# Patient Record
Sex: MALE | Race: ASIAN | NOT HISPANIC OR LATINO
[De-identification: names, ages, dates, MRNs, and addresses within clinical notes are randomized per-mention and may not be internally consistent; named-entity substitution may affect disease eponyms.]

---

## 2018-01-24 ENCOUNTER — HOSPITAL (OUTPATIENT)
Dept: OTHER | Age: 73
End: 2018-01-24

## 2018-05-07 PROBLEM — I10 ESSENTIAL HYPERTENSION: Status: ACTIVE | Noted: 2018-05-07

## 2018-05-07 PROBLEM — E78.5 DYSLIPIDEMIA: Status: ACTIVE | Noted: 2018-05-07

## 2018-08-11 PROBLEM — K21.9 GASTROESOPHAGEAL REFLUX DISEASE WITHOUT ESOPHAGITIS: Status: ACTIVE | Noted: 2018-08-11

## 2018-08-11 PROBLEM — R73.01 IFG (IMPAIRED FASTING GLUCOSE): Status: ACTIVE | Noted: 2018-08-11

## 2018-09-18 PROCEDURE — 88305 TISSUE EXAM BY PATHOLOGIST: CPT | Performed by: INTERNAL MEDICINE

## 2018-09-25 PROBLEM — D12.6 TUBULAR ADENOMA OF COLON: Status: ACTIVE | Noted: 2018-09-25

## 2019-06-13 PROBLEM — R07.89 OTHER CHEST PAIN: Status: ACTIVE | Noted: 2019-06-13

## 2019-06-25 ENCOUNTER — ORDER TRANSCRIPTION (OUTPATIENT)
Dept: ADMINISTRATIVE | Facility: HOSPITAL | Age: 74
End: 2019-06-25

## 2019-06-25 DIAGNOSIS — R94.39 ABNORMAL NUCLEAR STRESS TEST: Primary | ICD-10-CM

## 2019-07-23 ENCOUNTER — HOSPITAL ENCOUNTER (OUTPATIENT)
Dept: CT IMAGING | Facility: HOSPITAL | Age: 74
Discharge: HOME | End: 2019-07-23
Attending: INTERNAL MEDICINE
Payer: MEDICARE

## 2019-07-23 ENCOUNTER — HOSPITAL ENCOUNTER (OUTPATIENT)
Dept: CT IMAGING | Facility: HOSPITAL | Age: 74
End: 2019-07-23
Attending: INTERNAL MEDICINE
Payer: MEDICARE

## 2019-07-23 DIAGNOSIS — R94.39 ABNORMAL NUCLEAR STRESS TEST: ICD-10-CM

## 2019-07-23 DIAGNOSIS — R94.39 ABNORMAL STRESS TEST: ICD-10-CM

## 2019-07-23 PROCEDURE — 75574 CT ANGIO HRT W/3D IMAGE: CPT | Performed by: INTERNAL MEDICINE

## 2019-07-23 PROCEDURE — 82565 ASSAY OF CREATININE: CPT

## 2019-07-23 PROCEDURE — 0502T CTA FRACTIONAL FLOW RESERVE ANALYSIS (CPT=0503T/0502T): CPT | Performed by: INTERNAL MEDICINE

## 2019-07-23 PROCEDURE — 0503T CTA FRACTIONAL FLOW RESERVE ANALYSIS (CPT=0503T/0502T): CPT | Performed by: INTERNAL MEDICINE

## 2019-07-23 RX ORDER — METOPROLOL TARTRATE 5 MG/5ML
INJECTION INTRAVENOUS
Status: DISCONTINUED
Start: 2019-07-23 | End: 2019-07-23 | Stop reason: WASHOUT

## 2019-07-23 RX ORDER — NITROGLYCERIN 0.4 MG/1
TABLET SUBLINGUAL
Status: DISCONTINUED
Start: 2019-07-23 | End: 2019-07-23

## 2019-07-26 LAB — CREAT BLD-MCNC: 0.7 MG/DL (ref 0.7–1.3)

## 2019-09-10 ENCOUNTER — HOSPITAL ENCOUNTER (OUTPATIENT)
Dept: INTERVENTIONAL RADIOLOGY/VASCULAR | Facility: HOSPITAL | Age: 74
Setting detail: HOSPITAL OUTPATIENT SURGERY
Discharge: HOME | End: 2019-09-10
Attending: INTERNAL MEDICINE | Admitting: INTERNAL MEDICINE
Payer: MEDICARE

## 2019-09-10 VITALS
SYSTOLIC BLOOD PRESSURE: 118 MMHG | TEMPERATURE: 98 F | OXYGEN SATURATION: 96 % | HEART RATE: 61 BPM | BODY MASS INDEX: 21.1 KG/M2 | RESPIRATION RATE: 21 BRPM | WEIGHT: 136 LBS | DIASTOLIC BLOOD PRESSURE: 68 MMHG | HEIGHT: 67.5 IN

## 2019-09-10 DIAGNOSIS — R94.39 ABNORMAL STRESS TEST: ICD-10-CM

## 2019-09-10 PROCEDURE — B2151ZZ FLUOROSCOPY OF LEFT HEART USING LOW OSMOLAR CONTRAST: ICD-10-PCS | Performed by: INTERNAL MEDICINE

## 2019-09-10 PROCEDURE — B2111ZZ FLUOROSCOPY OF MULTIPLE CORONARY ARTERIES USING LOW OSMOLAR CONTRAST: ICD-10-PCS | Performed by: INTERNAL MEDICINE

## 2019-09-10 PROCEDURE — 4A023N7 MEASUREMENT OF CARDIAC SAMPLING AND PRESSURE, LEFT HEART, PERCUTANEOUS APPROACH: ICD-10-PCS | Performed by: INTERNAL MEDICINE

## 2019-09-10 PROCEDURE — 93458 L HRT ARTERY/VENTRICLE ANGIO: CPT

## 2019-09-10 RX ORDER — HEPARIN SODIUM 5000 [USP'U]/ML
INJECTION, SOLUTION INTRAVENOUS; SUBCUTANEOUS
Status: COMPLETED
Start: 2019-09-10 | End: 2019-09-10

## 2019-09-10 RX ORDER — LIDOCAINE HYDROCHLORIDE 10 MG/ML
INJECTION, SOLUTION EPIDURAL; INFILTRATION; INTRACAUDAL; PERINEURAL
Status: COMPLETED
Start: 2019-09-10 | End: 2019-09-10

## 2019-09-10 RX ORDER — MIDAZOLAM HYDROCHLORIDE 1 MG/ML
INJECTION INTRAMUSCULAR; INTRAVENOUS
Status: COMPLETED
Start: 2019-09-10 | End: 2019-09-10

## 2019-09-10 NOTE — PROCEDURES
[employer]    PATIENT'S NAME: Ez Leone   ATTENDING PHYSICIAN: Nader Dickey M.D. OPERATING PHYSICIAN: Nader Dickey M.D.    PATIENT ACCOUNT#:   [de-identified]    LOCATION:  [address]  MEDICAL RECORD #:   [id number]       D obtaining informed consent, sedation was achieved with 2 mg of midazolam and 25 mcg of fentanyl. Start 0820, end [phone]. I was present the entire time with a trained observer. A 6-Indonesian sheath was placed in the right femoral artery.   Standard #4 left and

## 2019-09-10 NOTE — PROGRESS NOTES
S/P LHC right groin, c/d/I. VSS. Pedals palpable. Pt denies pain. sarah po well. Dr. Sivan Hooper at bedside with family. D/c instructions reviewed. Pt ambulated to washroom.  IV d/c'd and pt d/c'd to [facility] via JENNIFER Preciado in stable condition

## 2019-12-08 PROBLEM — I25.10 CORONARY ARTERY DISEASE INVOLVING NATIVE CORONARY ARTERY OF NATIVE HEART WITHOUT ANGINA PECTORIS: Status: ACTIVE | Noted: 2019-12-08

## 2019-12-08 PROBLEM — I77.9 ARTERIAL DISEASE (HCC): Status: ACTIVE | Noted: 2019-12-08

## 2019-12-08 PROBLEM — I77.9 ARTERIAL DISEASE: Status: ACTIVE | Noted: 2019-12-08

## 2019-12-18 PROBLEM — M54.2 NECK PAIN: Status: ACTIVE | Noted: 2019-12-18

## 2020-02-26 ENCOUNTER — TELEPHONE (OUTPATIENT)
Dept: NEUROLOGY | Facility: CLINIC | Age: 75
End: 2020-02-26

## 2020-02-26 ENCOUNTER — OFFICE VISIT (OUTPATIENT)
Dept: NEUROLOGY | Facility: CLINIC | Age: 75
End: 2020-02-26
Payer: MEDICARE

## 2020-02-26 ENCOUNTER — APPOINTMENT (OUTPATIENT)
Dept: LAB | Age: 75
End: 2020-02-26
Attending: Other
Payer: MEDICARE

## 2020-02-26 VITALS
HEART RATE: 64 BPM | SYSTOLIC BLOOD PRESSURE: 122 MMHG | RESPIRATION RATE: 16 BRPM | BODY MASS INDEX: 22 KG/M2 | DIASTOLIC BLOOD PRESSURE: 68 MMHG | WEIGHT: 141 LBS

## 2020-02-26 DIAGNOSIS — G44.099 OTHER TRIGEMINAL AUTONOMIC CEPHALGIA (TAC), NOT INTRACTABLE: Primary | ICD-10-CM

## 2020-02-26 DIAGNOSIS — G44.099 OTHER TRIGEMINAL AUTONOMIC CEPHALGIA (TAC), NOT INTRACTABLE: ICD-10-CM

## 2020-02-26 DIAGNOSIS — G44.039 EPISODIC PAROXYSMAL HEMICRANIA, NOT INTRACTABLE: ICD-10-CM

## 2020-02-26 LAB — SED RATE-ML: 14 MM/HR (ref 0–12)

## 2020-02-26 PROCEDURE — 99204 OFFICE O/P NEW MOD 45 MIN: CPT | Performed by: OTHER

## 2020-02-26 PROCEDURE — 36415 COLL VENOUS BLD VENIPUNCTURE: CPT

## 2020-02-26 PROCEDURE — 85652 RBC SED RATE AUTOMATED: CPT

## 2020-02-26 RX ORDER — INDOMETHACIN 25 MG/1
25 CAPSULE ORAL 2 TIMES DAILY WITH MEALS
Qty: 60 CAPSULE | Refills: 2 | Status: SHIPPED | OUTPATIENT
Start: 2020-02-26 | End: 2020-03-24

## 2020-02-26 NOTE — TELEPHONE ENCOUNTER
Per Dr. Dominique Albarran pt Sed Rate ok, ok to start Indomethacin.     Spoke with pt, relayed that per Dr. Dominique Albarran, Sedimentation Rate ok, can start Indomethacin 1 tab bid, with food if needed, also, pt may take Indomethacin 1 tab tid with food if tolerating lower

## 2020-02-26 NOTE — PROGRESS NOTES
HPI:    Patient ID: David Castano is a 69year old male. PCP: Dr Vu Rich    Headache        Patient is a pleasant 69year old man with history of hypertension, hyperlipidemia and CAD who presents for evaluation of new onset headaches.  He has been having Dispense Refill   • TOBRADEX 0.3-0.1 % Ophthalmic Ointment Apply 0.1 Doses to eye daily. • Tolterodine Tartrate 1 MG Oral Tab Take 1 tablet (1 mg total) by mouth 2 (two) times daily.  60 tablet 5   • Benazepril HCl 10 MG Oral Tab Take 1 tablet (10 mg to Intact to all modalities including light touch, pinprick, vibration and proprioception  Motor: Normal tone and bulk in all extremities.  Strength is 5/5 in all muscle groups  Reflexes: symmetric and present  Coordination: Intact  Gait: Normal           ASSE

## 2020-02-26 NOTE — PROGRESS NOTES
Patient [id number] headaches in past month. PT did not help headaches but did help posture. Headaches are behind eyes and top of head.

## 2020-02-27 ENCOUNTER — TELEPHONE (OUTPATIENT)
Dept: NEUROLOGY | Facility: CLINIC | Age: 75
End: 2020-02-27

## 2020-02-27 NOTE — TELEPHONE ENCOUNTER
Received call from pt pharmacy, Insurance will not cover Indomethacin, per pharmacy pt insurance will cover Naproxen 375mg and Nabumetone.

## 2020-02-27 NOTE — TELEPHONE ENCOUNTER
Spoke with pharmacy to see if medication needed PA, Pharmacy states medication is not on pt's formulary. Per note below, Indomethacin (Indocin) is only medication indicated for Paroxysmal Hemicrania headache.     Need to contact pt to see if he is willing t

## 2020-02-27 NOTE — TELEPHONE ENCOUNTER
Patient notified of lab results.       ----- Message from Tita Gomez MD sent at 2/26/2020  2:15 PM CST -----  ESR slightly elevated but not significant for temporal arteritis

## 2020-03-20 ENCOUNTER — TELEPHONE (OUTPATIENT)
Dept: NEUROLOGY | Facility: CLINIC | Age: 75
End: 2020-03-20

## 2020-03-20 NOTE — TELEPHONE ENCOUNTER
Anthony Gallegos called imtiaz verbally consents to a    Virtual/Telephone Check-In service on 03/24/2020 at 3pm.    Patient understands and accepts financial responsibility for any deductible, co-insurance and/or co-pays associated with this service.     Savanna Dong

## 2020-03-24 ENCOUNTER — VIRTUAL CHECK-IN (OUTPATIENT)
Dept: NEUROLOGY | Facility: CLINIC | Age: 75
End: 2020-03-24

## 2020-03-24 DIAGNOSIS — G44.099 OTHER TRIGEMINAL AUTONOMIC CEPHALGIA (TAC), NOT INTRACTABLE: Primary | ICD-10-CM

## 2020-03-24 PROCEDURE — G2012 BRIEF CHECK IN BY MD/QHP: HCPCS | Performed by: OTHER

## 2020-03-24 RX ORDER — INDOMETHACIN 25 MG/1
25 CAPSULE ORAL 2 TIMES DAILY WITH MEALS
Qty: 60 CAPSULE | Refills: 2 | Status: SHIPPED | OUTPATIENT
Start: 2020-03-24 | End: 2020-06-26

## 2020-03-24 NOTE — TELEPHONE ENCOUNTER
Virtual/Telephone Check-In    Sandhya Quijano verbally consents a Virtual/Telephone Check-In service on 03/24/20. Patient understands and accepts financial responsibility for any deductible, co-insurance and/or co-pays associated with this service.     Dur

## 2020-04-25 PROBLEM — G44.039 EPISODIC PAROXYSMAL HEMICRANIA: Status: ACTIVE | Noted: 2020-04-25

## 2020-06-26 ENCOUNTER — TELEPHONE (OUTPATIENT)
Dept: NEUROLOGY | Facility: CLINIC | Age: 75
End: 2020-06-26

## 2020-06-26 ENCOUNTER — OFFICE VISIT (OUTPATIENT)
Dept: NEUROLOGY | Facility: CLINIC | Age: 75
End: 2020-06-26
Payer: MEDICARE

## 2020-06-26 VITALS
DIASTOLIC BLOOD PRESSURE: 70 MMHG | BODY MASS INDEX: 22 KG/M2 | WEIGHT: 138 LBS | SYSTOLIC BLOOD PRESSURE: 118 MMHG | RESPIRATION RATE: 16 BRPM | HEART RATE: 68 BPM

## 2020-06-26 DIAGNOSIS — G44.039 EPISODIC PAROXYSMAL HEMICRANIA, NOT INTRACTABLE: Primary | ICD-10-CM

## 2020-06-26 DIAGNOSIS — G44.099 OTHER TRIGEMINAL AUTONOMIC CEPHALGIA (TAC), NOT INTRACTABLE: Primary | ICD-10-CM

## 2020-06-26 DIAGNOSIS — G44.099 OTHER TRIGEMINAL AUTONOMIC CEPHALGIA (TAC), NOT INTRACTABLE: ICD-10-CM

## 2020-06-26 PROCEDURE — 99213 OFFICE O/P EST LOW 20 MIN: CPT | Performed by: OTHER

## 2020-06-26 RX ORDER — INDOMETHACIN 25 MG/1
25 CAPSULE ORAL 2 TIMES DAILY WITH MEALS
Qty: 60 CAPSULE | Refills: 5 | Status: SHIPPED | OUTPATIENT
Start: 2020-06-26 | End: 2020-12-16

## 2020-06-26 RX ORDER — DOXYCYCLINE HYCLATE 50 MG/1
2 CAPSULE ORAL DAILY
COMMUNITY
Start: 2020-06-08 | End: 2021-08-12 | Stop reason: ALTCHOICE

## 2020-06-26 NOTE — PROGRESS NOTES
HPI:    Patient ID: Nasima Godinez is a 69year old male. PCP: Dr Weaver Men    Headache        Patient is a pleasant 69year old man who presented for paroxysmal hemicrania type of headache. States since we started Indocin headache have improved.  He states Tab Take 1 tablet (10 mg total) by mouth daily. 90 tablet 3   • metoprolol Tartrate 25 MG Oral Tab Take 1 tablet (25 mg total) by mouth 2 (two) times daily. 180 tablet 3   • simvastatin 20 MG Oral Tab Take 1 tablet (20 mg total) by mouth nightly.  90 tablet may take an extra as needed  Follow up in about 6 months    See orders and medications filed with this encounter. The patient indicates understanding of these issues and agrees with the plan.         Evaristo Devlin MD  [facility]

## 2020-06-29 NOTE — TELEPHONE ENCOUNTER
Additional information requested from Caren Nolasco at [facility] for PA determination. Tried and failed medications given.

## 2020-06-30 ENCOUNTER — TELEPHONE (OUTPATIENT)
Dept: NEUROLOGY | Facility: CLINIC | Age: 75
End: 2020-06-30

## 2020-06-30 NOTE — TELEPHONE ENCOUNTER
Received Authorization Fax from [facility]     Indomethacin 25 MG Oral Cap Authorized     *Approved for a Quantity : 60 tablets per 30 day(s). Valid Dates: 06-26/20 - Until Further Notice.

## 2020-06-30 NOTE — TELEPHONE ENCOUNTER
Informed the pt that this med was refilled on 6/26/20. Verbalized understanding, no further questions.

## 2020-10-10 PROBLEM — I73.00 RAYNAUD'S PHENOMENON WITHOUT GANGRENE: Status: ACTIVE | Noted: 2020-10-10

## 2020-12-16 ENCOUNTER — TELEMEDICINE (OUTPATIENT)
Dept: NEUROLOGY | Facility: CLINIC | Age: 75
End: 2020-12-16
Payer: MEDICARE

## 2020-12-16 DIAGNOSIS — G44.039 EPISODIC PAROXYSMAL HEMICRANIA, NOT INTRACTABLE: Primary | ICD-10-CM

## 2020-12-16 PROCEDURE — 99213 OFFICE O/P EST LOW 20 MIN: CPT | Performed by: OTHER

## 2020-12-16 RX ORDER — INDOMETHACIN 25 MG/1
25 CAPSULE ORAL 2 TIMES DAILY WITH MEALS
Qty: 180 CAPSULE | Refills: 1 | Status: SHIPPED | OUTPATIENT
Start: 2020-12-16 | End: 2021-08-12

## 2020-12-17 NOTE — PROGRESS NOTES
HPI:    Patient ID: Broderick Westfall is a 69year old male. This visit is conducted using Telemedicine with live, interactive video and audio.     Patient has been referred to the [facility] website at [email or web address] to review the yearly Consent to Tr mouth 2 (two) times daily with meals. 180 capsule 1   • amLODIPine Besylate 5 MG Oral Tab Take 1 tablet (5 mg total) by mouth daily. 90 tablet 3   • Doxycycline Hyclate 50 MG Oral Cap Take 2 capsules by mouth daily.      • amLODIPine Besylate 5 MG Oral Tab take it with food and use antigas/antiacid  If still no response then take Indocin as needed    Follow up in about 6 months.    Total time spent approximately 15 minutes with greater than 50% spent in care co-ordination and counseling    See orders and medi

## 2021-01-12 NOTE — TELEPHONE ENCOUNTER
Spoke with the pharmacy who states that the patient has refills remaining. Will advised the patient to contact his preferred pharmacy.        Medication: indomethacin 25 MG Oral Cap    Date of last refill: 12/16/2020 (#180/1)  [place]

## 2021-01-15 RX ORDER — INDOMETHACIN 25 MG/1
25 CAPSULE ORAL 2 TIMES DAILY WITH MEALS
Qty: 180 CAPSULE | Refills: 1 | OUTPATIENT
Start: 2021-01-15

## 2021-03-02 PROBLEM — M19.041: Status: ACTIVE | Noted: 2021-03-02

## 2021-03-02 PROBLEM — M67.441 DIGITAL MUCINOUS CYST OF FINGER OF RIGHT HAND: Status: ACTIVE | Noted: 2021-03-02

## 2021-03-10 ENCOUNTER — LAB REQUISITION (OUTPATIENT)
Dept: LAB | Facility: HOSPITAL | Age: 76
End: 2021-03-10
Payer: MEDICARE

## 2021-03-10 DIAGNOSIS — M67.441 GANGLION, RIGHT HAND: ICD-10-CM

## 2021-03-10 PROCEDURE — 88304 TISSUE EXAM BY PATHOLOGIST: CPT | Performed by: ORTHOPAEDIC SURGERY

## 2021-04-26 PROBLEM — R07.89 OTHER CHEST PAIN: Status: RESOLVED | Noted: 2019-06-13 | Resolved: 2021-04-26

## 2021-04-26 PROBLEM — M54.2 NECK PAIN: Status: RESOLVED | Noted: 2019-12-18 | Resolved: 2021-04-26

## 2021-07-08 ENCOUNTER — PATIENT MESSAGE (OUTPATIENT)
Dept: NEUROLOGY | Facility: CLINIC | Age: 76
End: 2021-07-08

## 2021-07-09 NOTE — TELEPHONE ENCOUNTER
From: Giovanna Liu  To: Milagros Arzola MD  Sent: 7/8/2021 4:57 PM CDT  Subject: Other    Erica, Dr. Yadi Carpenter. Trust you and family are doing well.   I just realized that in my television with you last December, you wanted me to see you in 6-months an

## 2021-08-12 ENCOUNTER — OFFICE VISIT (OUTPATIENT)
Dept: NEUROLOGY | Facility: CLINIC | Age: 76
End: 2021-08-12
Payer: MEDICARE

## 2021-08-12 VITALS
BODY MASS INDEX: 21 KG/M2 | DIASTOLIC BLOOD PRESSURE: 58 MMHG | WEIGHT: 136 LBS | HEART RATE: 72 BPM | SYSTOLIC BLOOD PRESSURE: 119 MMHG | RESPIRATION RATE: 16 BRPM

## 2021-08-12 DIAGNOSIS — G44.039 EPISODIC PAROXYSMAL HEMICRANIA, NOT INTRACTABLE: Primary | ICD-10-CM

## 2021-08-12 DIAGNOSIS — G44.099 OTHER TRIGEMINAL AUTONOMIC CEPHALGIA (TAC), NOT INTRACTABLE: ICD-10-CM

## 2021-08-12 PROCEDURE — 99213 OFFICE O/P EST LOW 20 MIN: CPT | Performed by: OTHER

## 2021-08-12 RX ORDER — INDOMETHACIN 25 MG/1
25 CAPSULE ORAL 2 TIMES DAILY WITH MEALS
Qty: 180 CAPSULE | Refills: 1 | Status: SHIPPED | OUTPATIENT
Start: 2021-08-12 | End: 2021-12-29

## 2021-08-12 NOTE — PATIENT INSTRUCTIONS
After your visit at the [facility] office  today, please direct any follow up questions or medication needs to the staff in our [place] office so that your concerns may be promptly addressed.   We are available through [employer] or at the numbers below: be picked up in office. • Please allow the office 2-3 business days to fill the prescription. • Patient must present photo ID at time of . PLEASE NOTE: PRESCRIPTIONS MUST BE PICKED UP PRIOR TO 3:00PM MONDAY-FRIDAY    Scheduling Tests:     If your submitting forms to office staff. • Form completion may require an additional fee. • A signed Release of Information (ROOPA) must be on file before forms may be submitted. When dropping off forms, please ask the  for this paper.    • Failure

## 2021-08-12 NOTE — PROGRESS NOTES
HPI:    Patient ID: Malissa Carrel is a 77year old male. PCP: Dr Norm Singleton    Headache     Neurologic Problem  Associated symptoms include headaches.      Patient is a pleasant 77year old man who presented for follow up for probable paroxysmal hemicrania t tablet 5   • amLODIPine Besylate 10 MG Oral Tab Take 1 tablet (10 mg total) by mouth daily. 90 tablet 3   • Benazepril HCl 10 MG Oral Tab Take 1 tablet (10 mg total) by mouth daily.  90 tablet 3   • metoprolol Tartrate 25 MG Oral Tab Take 1 tablet (25 mg to patient indicates understanding of these issues and agrees with the plan. Isaac Montana MD  [facility]          No orders of the defined types were placed in this encounter.       Meds This Visit:  Requested Prescriptions

## 2021-12-29 ENCOUNTER — TELEPHONE (OUTPATIENT)
Dept: NEUROLOGY | Facility: CLINIC | Age: 76
End: 2021-12-29

## 2021-12-29 DIAGNOSIS — G44.099 OTHER TRIGEMINAL AUTONOMIC CEPHALGIA (TAC), NOT INTRACTABLE: Primary | ICD-10-CM

## 2021-12-29 RX ORDER — INDOMETHACIN 25 MG/1
25 CAPSULE ORAL 2 TIMES DAILY WITH MEALS
Qty: 180 CAPSULE | Refills: 1 | Status: SHIPPED | OUTPATIENT
Start: 2021-12-29

## 2021-12-29 RX ORDER — INDOMETHACIN 25 MG/1
25 CAPSULE ORAL 2 TIMES DAILY WITH MEALS
Qty: 180 CAPSULE | Refills: 1 | Status: SHIPPED | OUTPATIENT
Start: 2021-12-29 | End: 2021-12-29

## 2021-12-29 NOTE — TELEPHONE ENCOUNTER
Patient calling to request refill ofneed refill on indomethacin [phone] [facility]    Patient informed of 48 hour refill policy excluding weekends and holidays. Informed patient prescription is sent directly to pharmacy.     Further

## 2021-12-29 NOTE — TELEPHONE ENCOUNTER
Rx Indomethacin sent to [facility] per patient request and [facility] prescription cancelled.

## 2021-12-29 NOTE — TELEPHONE ENCOUNTER
Medication: indomethacin 25 MG Oral Cap     Date of last refill: 8/12/21 (#180/1)  [place]     Last office visit: 8/12/21  Due back to clinic per last office note:  6 months  Date next office visit scheduled:     Gómez

## 2022-02-16 ENCOUNTER — OFFICE VISIT (OUTPATIENT)
Dept: NEUROLOGY | Facility: CLINIC | Age: 77
End: 2022-02-16
Payer: MEDICARE

## 2022-02-16 VITALS
HEIGHT: 67 IN | DIASTOLIC BLOOD PRESSURE: 62 MMHG | BODY MASS INDEX: 22.13 KG/M2 | RESPIRATION RATE: 12 BRPM | HEART RATE: 72 BPM | SYSTOLIC BLOOD PRESSURE: 130 MMHG | WEIGHT: 141 LBS

## 2022-02-16 DIAGNOSIS — R41.3 SHORT-TERM MEMORY LOSS: ICD-10-CM

## 2022-02-16 DIAGNOSIS — R42 DIZZINESS: ICD-10-CM

## 2022-02-16 DIAGNOSIS — G44.039 EPISODIC PAROXYSMAL HEMICRANIA, NOT INTRACTABLE: ICD-10-CM

## 2022-02-16 PROCEDURE — 99214 OFFICE O/P EST MOD 30 MIN: CPT | Performed by: OTHER

## 2022-02-16 NOTE — PROGRESS NOTES
LOV 8/12/21 Headache f/u- Patient states his headaches come & go. Patient states intensity is about the same. Patient states duration of headaches 1-3 minutes usually.

## 2022-02-21 ENCOUNTER — LAB ENCOUNTER (OUTPATIENT)
Dept: LAB | Age: 77
End: 2022-02-21
Attending: Other
Payer: MEDICARE

## 2022-02-21 DIAGNOSIS — R41.3 SHORT-TERM MEMORY LOSS: ICD-10-CM

## 2022-02-21 LAB
TSI SER-ACNC: 3.74 MIU/ML (ref 0.36–3.74)
VIT B12 SERPL-MCNC: 366 PG/ML (ref 193–986)

## 2022-02-21 PROCEDURE — 82607 VITAMIN B-12: CPT

## 2022-02-21 PROCEDURE — 36415 COLL VENOUS BLD VENIPUNCTURE: CPT

## 2022-02-21 PROCEDURE — 84443 ASSAY THYROID STIM HORMONE: CPT

## 2022-02-22 ENCOUNTER — PATIENT MESSAGE (OUTPATIENT)
Dept: NEUROLOGY | Facility: CLINIC | Age: 77
End: 2022-02-22

## 2022-02-22 NOTE — TELEPHONE ENCOUNTER
From: Semaj Thorne  To: Marni Johnston MD  Sent: 2/22/2022 9:19 AM CST  Subject: Test Result Feedback    Good morning, Dr. Clint Bills. At your convenience, appreciate your review/recommendation of the blood test results. Thank you.   Juan Daniel Figueroa

## 2022-02-28 ENCOUNTER — TELEPHONE (OUTPATIENT)
Dept: NEUROLOGY | Facility: CLINIC | Age: 77
End: 2022-02-28

## 2022-02-28 NOTE — TELEPHONE ENCOUNTER
When I call the number the office indicates he does not work with that office any longer. When I google search I get Candie Marley with a \"N\" I'm confused if the last name is spelled correctly. Can you please call me with correct spelling?

## 2022-02-28 NOTE — TELEPHONE ENCOUNTER
Informed pt that providers name is Dr Chen Muse. Advised pt that if that provider is no longer there he can see any of the doctors on the referral. Verbalized understanding, no further questions.

## 2022-07-09 ENCOUNTER — HOSPITAL ENCOUNTER (EMERGENCY)
Facility: HOSPITAL | Age: 77
Discharge: HOME | End: 2022-07-09
Attending: EMERGENCY MEDICINE
Payer: MEDICARE

## 2022-07-09 VITALS
SYSTOLIC BLOOD PRESSURE: 138 MMHG | RESPIRATION RATE: 18 BRPM | HEART RATE: 70 BPM | WEIGHT: 140 LBS | OXYGEN SATURATION: 98 % | DIASTOLIC BLOOD PRESSURE: 72 MMHG | BODY MASS INDEX: 22 KG/M2 | TEMPERATURE: 98 F

## 2022-07-09 DIAGNOSIS — S61.432A PUNCTURE WOUND OF LEFT HAND WITHOUT FOREIGN BODY, INITIAL ENCOUNTER: Primary | ICD-10-CM

## 2022-07-09 PROCEDURE — 99283 EMERGENCY DEPT VISIT LOW MDM: CPT

## 2022-07-09 RX ORDER — ROSUVASTATIN CALCIUM 5 MG/1
5 TABLET, COATED ORAL NIGHTLY
COMMUNITY
Start: 2022-06-27

## 2022-07-09 RX ORDER — CEPHALEXIN 500 MG/1
500 CAPSULE ORAL 3 TIMES DAILY
Qty: 15 CAPSULE | Refills: 0 | Status: SHIPPED | OUTPATIENT
Start: 2022-07-09 | End: 2022-07-14

## 2022-07-09 RX ORDER — CEPHALEXIN 500 MG/1
500 CAPSULE ORAL ONCE
Status: COMPLETED | OUTPATIENT
Start: 2022-07-09 | End: 2022-07-09

## 2022-12-20 ENCOUNTER — TELEPHONE (OUTPATIENT)
Dept: NEUROLOGY | Facility: CLINIC | Age: 77
End: 2022-12-20

## 2022-12-20 DIAGNOSIS — G44.099 OTHER TRIGEMINAL AUTONOMIC CEPHALGIA (TAC), NOT INTRACTABLE: ICD-10-CM

## 2022-12-20 RX ORDER — INDOMETHACIN 25 MG/1
25 CAPSULE ORAL 2 TIMES DAILY WITH MEALS
Qty: 180 CAPSULE | Refills: 1 | Status: SHIPPED | OUTPATIENT
Start: 2022-12-20

## 2022-12-20 NOTE — TELEPHONE ENCOUNTER
Patient calling to request refill of Indomethacin  [address] [place] [address] [address] [facility], [address] [place] [address]  [phone], [phone]    Patient informed of 48 hour refill policy excluding weekends and holidays. Informed patient prescription is sent directly to pharmacy. Further explained patient will not receive a call back once prescription is ready.

## 2022-12-29 ENCOUNTER — OFFICE VISIT (OUTPATIENT)
Dept: NEUROLOGY | Facility: CLINIC | Age: 77
End: 2022-12-29
Payer: MEDICARE

## 2022-12-29 VITALS
HEART RATE: 64 BPM | WEIGHT: 140 LBS | SYSTOLIC BLOOD PRESSURE: 108 MMHG | DIASTOLIC BLOOD PRESSURE: 64 MMHG | RESPIRATION RATE: 16 BRPM | BODY MASS INDEX: 22 KG/M2

## 2022-12-29 DIAGNOSIS — R41.3 SHORT-TERM MEMORY LOSS: Primary | ICD-10-CM

## 2022-12-29 DIAGNOSIS — F34.1 DYSTHYMIA: ICD-10-CM

## 2022-12-29 DIAGNOSIS — G44.099 OTHER TRIGEMINAL AUTONOMIC CEPHALGIA (TAC), NOT INTRACTABLE: ICD-10-CM

## 2022-12-29 PROCEDURE — 99213 OFFICE O/P EST LOW 20 MIN: CPT | Performed by: OTHER

## 2022-12-29 RX ORDER — AMLODIPINE BESYLATE 10 MG/1
10 TABLET ORAL DAILY
COMMUNITY
Start: 2022-05-09 | End: 2023-05-04

## 2022-12-29 RX ORDER — BENAZEPRIL HYDROCHLORIDE 20 MG/1
1 TABLET ORAL AS DIRECTED
COMMUNITY
Start: 2022-11-01

## 2023-06-22 RX ORDER — ALFUZOSIN HYDROCHLORIDE 10 MG/1
10 TABLET, EXTENDED RELEASE ORAL DAILY
COMMUNITY

## 2023-07-05 ENCOUNTER — HOSPITAL ENCOUNTER (OUTPATIENT)
Facility: HOSPITAL | Age: 78
Setting detail: HOSPITAL OUTPATIENT SURGERY
Discharge: HOME | End: 2023-07-05
Attending: INTERNAL MEDICINE | Admitting: INTERNAL MEDICINE
Payer: MEDICARE

## 2023-07-05 VITALS
RESPIRATION RATE: 16 BRPM | WEIGHT: 140 LBS | DIASTOLIC BLOOD PRESSURE: 54 MMHG | SYSTOLIC BLOOD PRESSURE: 99 MMHG | HEART RATE: 71 BPM | HEIGHT: 66 IN | TEMPERATURE: 98 F | OXYGEN SATURATION: 98 % | BODY MASS INDEX: 22.5 KG/M2

## 2023-07-05 PROCEDURE — 0DJD8ZZ INSPECTION OF LOWER INTESTINAL TRACT, VIA NATURAL OR ARTIFICIAL OPENING ENDOSCOPIC: ICD-10-PCS | Performed by: INTERNAL MEDICINE

## 2023-07-05 PROCEDURE — 99152 MOD SED SAME PHYS/QHP 5/>YRS: CPT | Performed by: INTERNAL MEDICINE

## 2023-07-05 RX ORDER — SODIUM CHLORIDE, SODIUM LACTATE, POTASSIUM CHLORIDE, CALCIUM CHLORIDE 600; 310; 30; 20 MG/100ML; MG/100ML; MG/100ML; MG/100ML
INJECTION, SOLUTION INTRAVENOUS CONTINUOUS
Status: DISCONTINUED | OUTPATIENT
Start: 2023-07-05 | End: 2023-07-05

## 2023-07-05 RX ORDER — MIDAZOLAM HYDROCHLORIDE 1 MG/ML
INJECTION INTRAMUSCULAR; INTRAVENOUS
Status: DISCONTINUED | OUTPATIENT
Start: 2023-07-05 | End: 2023-07-05

## 2023-07-05 NOTE — OPERATIVE REPORT
[facility] Patient Status:  Hospital Outpatient Surgery    1945 MRN [id number]   Location [id number] [place] [address] Attending Nancy Ackerman MD   Hosp Day # 0 PCP Mark Morrison MD         PATIENT NAME: Vishal Juarez  DATE OF OPERATION: 2023    PREOPERATIVE DIAGNOSIS:  CRC screening Hx polyp  POSTOPERATIVE DIAGNOSIS:  Normal colon    PROCEDURE PERFORMED: Colonoscopy with conscious sedation  SURGEON: Daisy Becker MD   MEDICATIONS: Fentanyl 100 mcg IV and Versed 4 mg IV in divided doses under the supervision of Dr. Ann Becker. PROCEDURE AND FINDINGS: The patient was placed into the left lateral decubitus position after informed consent was obtained. All questions were answered. An ASA score was assigned, Mallampati score 1. IV sedation was administered. A rectal exam was performed which was normal.  The Olympus video colonoscope was then introduced through the rectum and advanced through the colon to the cecum. The quality of prep was excellent, adequate, Aronchick 1. The cecum was identified by the ileocecal valve and appendiceal orifice. The colonoscope was then withdrawn and the mucosa was further carefully inspected. The entire examined colon was otherwise normal.  After retroflexion in the rectum, the colonoscope was straightened and removed and the procedure was completed. The patient tolerated the procedure well. There were no implants placed nor significant blood loss. There were no immediate apparent complications. Total moderate sedation time was 17 minutes. A trained sedation nurse was present to assist in monitoring the patient during the entire length of the moderate sedation time. RECOMMENDATIONS   Consume a high fiber diet. No repeat colonoscopy needed    Daisy Becker MD

## 2023-07-05 NOTE — DISCHARGE INSTRUCTIONS
[facility]    Procedure(s): Colonoscopy    Findings:    1. Normal colon    Disposition:  home    Patient Instructions:     1. Consume a high fiber diet. 2.  No repeat colonoscopy needed. Ryan Casiano MD    Home Care Instructions for Colonoscopy with Sedation    Diet:  - Resume your regular diet   - Start with light meals to minimize bloating.  - Do not drink alcohol today. Medication:  - If you have questions about resuming your normal medications, please contact your Primary Care Physician. Activities:  - Take it easy today. Do not return to work today. - Do not drive today. - Do not operate any machinery today (including kitchen equipment). Colonoscopy:  - You may notice some rectal \"spotting\" (a little blood on the toilet tissue) for a day or two after the exam. This is normal.  - If you experience any rectal bleeding (not spotting), persistent tenderness or sharp severe abdominal pains, oral temperature over 100 degrees Fahrenheit, light-headedness or dizziness, or any other problems, contact your doctor. **If unable to reach your doctor, please go to the [facility] Emergency Room**    - Your referring physician will receive a full report of your examination.  - If you do not hear from your doctor's office within two weeks of your biopsy, please call them for your results.

## 2023-07-05 NOTE — PRE-SEDATION ASSESSMENT
Physician Pre-Sedation Assessment    Pre-Sedation Assessment:    Sedation History: Previous Sedation with No Complications and Airway Assessed    Cardiac: normal S1, S2  Respiratory: breath sounds clear bilaterally   Abdomen: soft, BS (+), non-tender    ASA Classification: 2.  Patient with mild systemic disease    Plan: IV Sedation
Pfizer dose 1 and 2

## 2023-10-02 DIAGNOSIS — G44.099 OTHER TRIGEMINAL AUTONOMIC CEPHALGIA (TAC), NOT INTRACTABLE: ICD-10-CM

## 2023-10-02 RX ORDER — INDOMETHACIN 25 MG/1
25 CAPSULE ORAL 2 TIMES DAILY WITH MEALS
Qty: 60 CAPSULE | Refills: 0 | Status: SHIPPED | OUTPATIENT
Start: 2023-10-02

## 2023-10-02 NOTE — TELEPHONE ENCOUNTER
Spoke with patient who denies missing any doses of Indocin. Patient states he will be traveling to [facility] in early Nov -March. Armaan't scheduled with Dr Scotty Toledo on 10/25. Medication: Indomethacin     Date of last refill: 12/20/22 (#180/1)  [place]      Last office visit: 12/29/2022  Due back to clinic per last office note:  6 months  Date next office visit scheduled:    No future appointments. Last OV note recommendation:    1. 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 The patient believes that he was going through some personal stress and that reflected on the testing. Does not feel like she has any depression  Observation for now. We will repeat the testing next year         2. Episodic paroxysmal hemicrania  Stable.   On Indocin as needed     Follow up in about 6 months

## 2023-10-02 NOTE — TELEPHONE ENCOUNTER
Pt requesting refill for indomethacin 25 MG Oral Cap; [phone]. Please advise, Pt's best call back number is [phone]. Endorsed to RN for Provider.

## 2023-10-25 ENCOUNTER — OFFICE VISIT (OUTPATIENT)
Dept: NEUROLOGY | Facility: CLINIC | Age: 78
End: 2023-10-25

## 2023-10-25 VITALS
BODY MASS INDEX: 23 KG/M2 | SYSTOLIC BLOOD PRESSURE: 128 MMHG | DIASTOLIC BLOOD PRESSURE: 68 MMHG | HEART RATE: 62 BPM | RESPIRATION RATE: 16 BRPM | WEIGHT: 144.81 LBS

## 2023-10-25 DIAGNOSIS — G44.099 OTHER TRIGEMINAL AUTONOMIC CEPHALGIA (TAC), NOT INTRACTABLE: ICD-10-CM

## 2023-10-25 DIAGNOSIS — R41.3 SHORT-TERM MEMORY LOSS: Primary | ICD-10-CM

## 2023-10-25 PROCEDURE — 99213 OFFICE O/P EST LOW 20 MIN: CPT | Performed by: OTHER

## 2023-10-25 RX ORDER — INDOMETHACIN 25 MG/1
25 CAPSULE ORAL 2 TIMES DAILY WITH MEALS
Qty: 60 CAPSULE | Refills: 5 | Status: SHIPPED | OUTPATIENT
Start: 2023-10-25

## 2023-10-25 RX ORDER — AMLODIPINE BESYLATE 10 MG/1
10 TABLET ORAL DAILY
COMMUNITY
Start: 2023-05-11 | End: 2024-05-05

## 2023-10-25 RX ORDER — METOPROLOL TARTRATE 50 MG/1
TABLET, FILM COATED ORAL
COMMUNITY
Start: 2023-10-23

## 2023-10-25 NOTE — PROGRESS NOTES
HPI:    Patient ID: Mark Kumari is a 69year old male. Memory Loss  The patient's primary symptoms include memory loss. Pertinent negatives include no dizziness. Patient is a 69year old male presents for follow-up and to discuss the neuropsychology evaluation report. He was advised on last visit to get a neuropsychology evaluation done for short-term memory problem. Family had noticed that his decision making skills are not as good as before. States due to Covid pandemic he was having very limited social interaction and was probably feeling slightly low at that time. Also her mother in law and was not doing well- had a stroke and then fell and had femur fracture. Neuropsychology evaluation done reports dysthmic disorder and mild neurocognitive disorder.          HISTORY:  Past Medical History:   Diagnosis Date    Chest pain     Coronary atherosclerosis     COVID-19 03/2023    fever, body aches, runny nose    Hearing impairment     per children    High blood pressure     High cholesterol     Hx of motion sickness     Hyperlipidemia     Visual impairment     glasses      Past Surgical History:   Procedure Laterality Date    COLONOSCOPY N/A 7/5/2023    Procedure: COLONOSCOPY;  Surgeon: Holley Ibarra MD;  Location: [facility] ENDOSCOPY    COLONOSCOPY & POLYPECTOMY  09/2018    adenoma- repeat 5 yrs    CYST REMOVAL Right 3/10/21--Dr. Ferraro Manual    thumb mucous cyst excision      Family History   Problem Relation Age of Onset    Asthma Mother     Diabetes Father     Heart Attack Father     Diabetes Brother     Diabetes Brother       Social History     Socioeconomic History    Marital status:    Tobacco Use    Smoking status: Never    Smokeless tobacco: Never   Vaping Use    Vaping Use: Never used   Substance and Sexual Activity    Alcohol use: Never     Alcohol/week: 0.0 standard drinks of alcohol    Drug use: Never   Other Topics Concern    Caffeine Concern Yes     Comment: coffee and tea- 2/day total Exercise No        Review of Systems   Constitutional: Negative. HENT: Negative. Eyes: Negative. Respiratory: Negative. Cardiovascular: Negative. Gastrointestinal: Negative. Endocrine: Negative. Genitourinary: Negative. Musculoskeletal: Negative. Skin: Negative. Allergic/Immunologic: Negative. Neurological:  Negative for dizziness, speech difficulty and numbness. Hematological: Negative. Psychiatric/Behavioral:  Positive for memory loss. All other systems reviewed and are negative. Current Outpatient Medications   Medication Sig Dispense Refill    amLODIPine 10 MG Oral Tab Take 1 tablet (10 mg total) by mouth daily. metoprolol tartrate 50 MG Oral Tab       indomethacin 25 MG Oral Cap Take 1 capsule (25 mg total) by mouth 2 (two) times daily with meals. 60 capsule 0    alfuzosin ER 10 MG Oral Tablet 24 Hr Take 1 tablet (10 mg total) by mouth daily. Benazepril HCl 20 MG Oral Tab Take 1 tablet (20 mg total) by mouth daily. rosuvastatin 5 MG Oral Tab Take 1 tablet (5 mg total) by mouth every other day. aspirin 81 MG Oral Tab Take 1 tablet (81 mg total) by mouth nightly. Allergies:  Codeine                 OTHER (SEE COMMENTS)    Comment:Light headed and dizzy  PHYSICAL EXAM:   Physical Exam  Blood pressure 128/68, pulse 62, resp. rate 16, weight 144 lb 12.8 oz (65.7 kg). General Appearance: Well nourished, well developed, no apparent distress. HEENT: Normocephalic and atraumatic. Cardiovascular: Normal rate, regular rhythm and normal heart sounds. Pulmonary/Chest: Effort normal and breath sounds normal.   Abdominal: Soft.  Bowel sounds are normal.   Psych: normal mood and affect    Neurological  Patient is awake, alert and oriented to person, place and time   Speech and language is intact    Cranial Nerves:   II: Visual acuity and visual field: normal  III: Pupils: equal, round, reactive to light  III,IV,VI: Extra Ocular Movements: intact  V: Facial sensation: intact  VII: Facial strength: intact  VIII: Hearing: intact  IX: Palate: intact  XI: Shoulder shrug: intact  XII: Tongue movement: normal    Motor: Normal tone. Strength is  5 out of 5 in all extremities bilaterally. Sensory: Sensory examination is normal to light touch and pinprick     Coordination: Finger-to-nose test intact    Gait: normal casual gait. Romberg negative. TESTS/IMAGING:     MRI brain- 2020  IMPRESSION:       1. No acute intracranial abnormality. 2. Mild to moderate chronic microvascular ischemic disease. ASSESSMENT/PLAN:   (R41.3) Short-term memory loss  (primary encounter diagnosis)    (G44.039) Episodic paroxysmal hemicrania, not intractable      1. 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 The patient believes that he was going through some personal stress and that reflected on the testing. Observation for now. We will repeat the testing as needed      2. Episodic paroxysmal hemicrania  Stable. On Indocin as needed    Follow up in about 1 year or as needed    See orders and medications filed with this encounter. The patient indicates understanding of these issues and agrees with the plan.       Alejandro Gordillo MD  [facility] [address]      Meds This Visit:  Requested Prescriptions      No prescriptions requested or ordered in this encounter       Imaging & Referrals:  None     YO#[id number]

## 2023-11-02 ENCOUNTER — TELEPHONE (OUTPATIENT)
Dept: NEUROLOGY | Facility: CLINIC | Age: 78
End: 2023-11-02

## 2023-11-02 DIAGNOSIS — G44.099 OTHER TRIGEMINAL AUTONOMIC CEPHALGIA (TAC), NOT INTRACTABLE: ICD-10-CM

## 2023-11-02 RX ORDER — INDOMETHACIN 25 MG/1
25 CAPSULE ORAL 2 TIMES DAILY WITH MEALS
Qty: 180 CAPSULE | Refills: 0 | Status: SHIPPED | OUTPATIENT
Start: 2023-11-02

## 2023-11-02 NOTE — TELEPHONE ENCOUNTER
The last Rx for indomethacin 25 MG Oral Cap was for 30 days. Please change back to 90 days. He will be leaving the country and needs the 90 days. Please advise, Pt's best call back number is [phone]. Endorsed to RN for Provider.

## 2023-11-02 NOTE — TELEPHONE ENCOUNTER
Resent prescription for a 90 day supply. Medication: indomethacin     Date of last refill: 10/25/2023 (#60/5)  [place] 78/00/7395     Last office visit: 10/25/2023  Due back to clinic per last office note:  1 year  Date next office visit scheduled:    No future appointments. Last OV note recommendation:    ASSESSMENT/PLAN:   (R41.3) Short-term memory loss  (primary encounter diagnosis)     (G44.039) Episodic paroxysmal hemicrania, not intractable        1. 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 The patient believes that he was going through some personal stress and that reflected on the testing. Observation for now. We will repeat the testing as needed        2. Episodic paroxysmal hemicrania  Stable.   On Indocin as needed

## 2024-12-02 DIAGNOSIS — G44.099 OTHER TRIGEMINAL AUTONOMIC CEPHALGIA (TAC), NOT INTRACTABLE: ICD-10-CM

## 2024-12-02 RX ORDER — INDOMETHACIN 25 MG/1
25 CAPSULE ORAL 2 TIMES DAILY WITH MEALS
Qty: 180 CAPSULE | Refills: 0 | Status: SHIPPED | OUTPATIENT
Start: 2024-12-02

## 2024-12-02 NOTE — TELEPHONE ENCOUNTER
Future Appointments   Date Time Provider Department Center   12/9/2024  4:10 PM Malik Guerrero MD [facility]

## 2024-12-02 NOTE — TELEPHONE ENCOUNTER
Sent the patient a [employer] message to make an appointment for further medication refills.       Medication: indomethacin 25 MG Oral Cap      Date of last refill: 11/02/2023 (#180/0)  [place]     Last office visit: 10/25/2023  Due back to clinic per last office note:  Around 10/25/2024  Date next office visit scheduled:    No future appointments.        Last OV note recommendation:    ASSESSMENT/PLAN:   (R41.3) Short-term memory loss  (primary encounter diagnosis)     (G44.039) Episodic paroxysmal hemicrania, not intractable        1.  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    The patient believes that he was going through some personal stress and that reflected on the testing.    Observation for now.  We will repeat the testing as needed        2. Episodic paroxysmal hemicrania  Stable.  On Indocin as needed     Follow up in about 1 year or as needed     See orders and medications filed with this encounter. The patient indicates understanding of these issues and agrees with the plan.        Malik Guerrero MD  [facility] [place]

## 2024-12-09 ENCOUNTER — OFFICE VISIT (OUTPATIENT)
Dept: NEUROLOGY | Facility: CLINIC | Age: 79
End: 2024-12-09
Payer: MEDICARE

## 2024-12-09 VITALS
HEART RATE: 68 BPM | BODY MASS INDEX: 23 KG/M2 | WEIGHT: 142 LBS | DIASTOLIC BLOOD PRESSURE: 62 MMHG | RESPIRATION RATE: 16 BRPM | SYSTOLIC BLOOD PRESSURE: 112 MMHG

## 2024-12-09 DIAGNOSIS — G44.099 OTHER TRIGEMINAL AUTONOMIC CEPHALGIA (TAC), NOT INTRACTABLE: Primary | ICD-10-CM

## 2024-12-09 PROCEDURE — 99213 OFFICE O/P EST LOW 20 MIN: CPT | Performed by: OTHER

## 2024-12-09 RX ORDER — EZETIMIBE 10 MG/1
10 TABLET ORAL NIGHTLY
COMMUNITY
Start: 2023-08-24

## 2024-12-09 RX ORDER — AMLODIPINE BESYLATE 10 MG/1
10 TABLET ORAL DAILY
COMMUNITY
Start: 2024-09-10

## 2024-12-09 NOTE — PATIENT INSTRUCTIONS
Refill policies:    Allow 2-3 business days for refills; controlled substances may take longer.  Contact your pharmacy at least 5 days prior to running out of medication and have them send an electronic request or submit request through the “request refill” option in your [employer] account.  Refills are not addressed on weekends; covering physicians do not authorize routine medications on weekends.  No narcotics or controlled substances are refilled after noon on Fridays or by on call physicians.  By law, narcotics must be electronically prescribed.  A 30 day supply with no refills is the maximum allowed.  If your prescription is due for a refill, you may be due for a follow up appointment.  To best provide you care, patients receiving routine medications need to be seen at least once a year.  Patients receiving narcotic/controlled substance medications need to be seen at least once every 3 months.  In the event that your preferred pharmacy does not have the requested medication in stock (e.g. Backordered), it is your responsibility to find another pharmacy that has the requested medication available.  We will gladly send a new prescription to that pharmacy at your request.    Scheduling Tests:    If your physician has ordered radiology tests such as MRI or CT scans, please contact Central Scheduling at [phone] right away to schedule the test.  Once scheduled, the [facility] Centralized Referral Team will work with your insurance carrier to obtain pre-certification or prior authorization.  Depending on your insurance carrier, approval may take 3-10 days.  It is highly recommended patients assure they have received an authorization before having a test performed.  If test is done without insurance authorization, patient may be responsible for the entire amount billed.      Precertification and Prior Authorizations:  If your physician has recommended that you have a procedure or additional testing performed the [facility]  Centralized Referral Team will contact your insurance carrier to obtain pre-certification or prior authorization.    You are strongly encouraged to contact your insurance carrier to verify that your procedure/test has been approved and is a COVERED benefit.  Although the [facility] Centralized Referral Team does its due diligence, the insurance carrier gives the disclaimer that \"Although the procedure is authorized, this does not guarantee payment.\"    Ultimately the patient is responsible for payment.   Thank you for your understanding in this matter.  Paperwork Completion:  If you require FMLA or disability paperwork for your recovery, please make sure to either drop it off or have it faxed to our office at [phone]. Be sure the form has your name and date of birth on it.  The form will be faxed to our Forms Department and they will complete it for you.  There is a 25$ fee for all forms that need to be filled out.  Please be aware there is a 10-14 day turnaround time.  You will need to sign a release of information (ROOPA) form if your paperwork does not come with one.  You may call the Forms Department with any questions at [phone].  Their fax number is [phone].

## 2024-12-09 NOTE — PROGRESS NOTES
HPI:    Patient ID: Jose Haley is a 78 year old male.    Neurologic Problem  The patient's primary symptoms include memory loss. Pertinent negatives include no dizziness.   Memory Loss  The patient's primary symptoms include memory loss. Pertinent negatives include no dizziness.         Mr Haley is a 78 year old male who presents for follow up. Still gets some intermittently headache, very infrequent and takes Indocin as needed  States memory is stable, no significant issues noted.No new complaints      Office visit: Oct 2023  Patient is a 77 year old male presents for follow-up and to discuss the neuropsychology evaluation report.  He was advised on last visit to get a neuropsychology evaluation done for short-term memory problem. Family had noticed that his decision making skills are not as good as before. States due to Covid pandemic he was having very limited social interaction and was probably feeling slightly low at that time. Also her mother in law and was not doing well- had a stroke and then fell and had femur fracture.  Neuropsychology evaluation done reports dysthmic disorder and mild neurocognitive disorder.         HISTORY:  Past Medical History:    Chest pain    Coronary atherosclerosis    COVID-19    fever, body aches, runny nose    Hearing impairment    per children    High blood pressure    High cholesterol    Hx of motion sickness    Hyperlipidemia    Visual impairment    glasses      Past Surgical History:   Procedure Laterality Date    Colonoscopy N/A 7/5/2023    Procedure: COLONOSCOPY;  Surgeon: Adolfo Murillo MD;  Location:  ENDOSCOPY    Colonoscopy & polypectomy  09/2018    adenoma- repeat 5 yrs    Cyst removal Right 3/10/21--Dr. Gildardo Archibald    thumb mucous cyst excision      Family History   Problem Relation Age of Onset    Asthma Mother     Diabetes Father     Heart Attack Father     Diabetes Brother     Diabetes Brother       Social History     Socioeconomic History    Marital  status:    Tobacco Use    Smoking status: Never    Smokeless tobacco: Never   Vaping Use    Vaping status: Never Used   Substance and Sexual Activity    Alcohol use: Never     Alcohol/week: 0.0 standard drinks of alcohol    Drug use: Never   Other Topics Concern    Caffeine Concern Yes     Comment: coffee and tea- 2/day total    Exercise No     Social Drivers of Health     Financial Resource Strain: Low Risk  (10/22/2024)    Received from [facility]    Overall Financial Resource Strain (CARDIA)     Difficulty of Paying Living Expenses: Not hard at all   Food Insecurity: No Food Insecurity (10/22/2024)    Received from [facility] - Food Insecurity     Worried About Running Out of Food in the Last Year: No     Ran Out of Food in the Last Year: No   Transportation Needs: No Transportation Needs (10/22/2024)    Received from [facility] - Transportation     Lack of Transportation: No   Physical Activity: Insufficiently Active (10/22/2024)    Received from [facility]    Exercise Vital Sign     Days of Exercise per Week: 3 days     Minutes of Exercise per Session: 40 min   Stress: No Stress Concern Present (10/22/2024)    Received from [facility]    [place] [employer] - Occupational Stress Questionnaire     Feeling of Stress : Only a little   Social Connections: Moderately Isolated (10/22/2024)    Received from [facility]    Social Connection and Isolation Panel [NHANES]     Frequency of Communication with Friends and Family: Three times a week     Frequency of Social Gatherings with Friends and Family: Three times a week     Attends Restorationist Services: Never     Active Member of Clubs or Organizations: No     Attends Club or Organization Meetings: Never     Marital Status:    Housing Stability: Not At Risk (10/22/2024)    Received from [facility] - Housing/Utilities     Has Housing: Yes      Worried About Losing Housing: No     Unable to Get Utilities: No        Review of Systems   Constitutional: Negative.    HENT: Negative.     Eyes: Negative.    Respiratory: Negative.     Cardiovascular: Negative.    Gastrointestinal: Negative.    Endocrine: Negative.    Genitourinary: Negative.    Musculoskeletal: Negative.    Skin: Negative.    Allergic/Immunologic: Negative.    Neurological:  Negative for dizziness, speech difficulty and numbness.   Hematological: Negative.    Psychiatric/Behavioral:  Positive for memory loss.    All other systems reviewed and are negative.           Current Outpatient Medications   Medication Sig Dispense Refill    amLODIPine 10 MG Oral Tab Take 1 tablet (10 mg total) by mouth daily.      ezetimibe 10 MG Oral Tab Take 1 tablet (10 mg total) by mouth nightly.      indomethacin 25 MG Oral Cap Take 1 capsule (25 mg total) by mouth 2 (two) times daily with meals. Patient needs appointment for further medication refills. 180 capsule 0    metoprolol tartrate 50 MG Oral Tab       alfuzosin ER 10 MG Oral Tablet 24 Hr Take 1 tablet (10 mg total) by mouth daily.      Benazepril HCl 20 MG Oral Tab Take 1 tablet (20 mg total) by mouth daily.      rosuvastatin 5 MG Oral Tab Take 1 tablet (5 mg total) by mouth every other day.      aspirin 81 MG Oral Tab Take 1 tablet (81 mg total) by mouth nightly.       Allergies:  Allergies   Allergen Reactions    Codeine OTHER (SEE COMMENTS)     Light headed and dizzy      PHYSICAL EXAM:   Physical Exam  Blood pressure 112/62, pulse 68, resp. rate 16, weight 142 lb (64.4 kg).      General Appearance: Well nourished, well developed, no apparent distress.   HEENT: Normocephalic and atraumatic.   Cardiovascular: Normal rate, regular rhythm and normal heart sounds.    Pulmonary/Chest: Effort normal and breath sounds normal.   Abdominal: Soft. Bowel sounds are normal.   Psych: normal mood and affect    Neurological  Patient is awake, alert and oriented to  person, place and time   Speech and language is intact    Cranial Nerves:   II: Visual acuity and visual field: normal  III: Pupils: equal, round, reactive to light  III,IV,VI: Extra Ocular Movements: intact  V: Facial sensation: intact  VII: Facial strength: intact  VIII: Hearing: intact  IX: Palate: intact  XI: Shoulder shrug: intact  XII: Tongue movement: normal    Motor: Normal tone. Strength is  5 out of 5 in all extremities bilaterally.    Sensory: Sensory examination is normal to light touch and pinprick     Coordination: Finger-to-nose test intact    Gait: normal casual gait. Romberg negative.       TESTS/IMAGING:     MRI brain- 2020  IMPRESSION:       1. No acute intracranial abnormality.   2. Mild to moderate chronic microvascular ischemic disease.      ASSESSMENT/PLAN:       ICD-10-CM    1. Other trigeminal autonomic cephalgia (TAC), not intractable  G44.099             1.  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    The patient believes that he was going through some personal stress and that reflected on the testing.    Observation for now.  We will repeat the testing as needed      2. Episodic paroxysmal hemicrania  Stable.  On Indocin as needed    Follow up in about 1 year or as needed    See orders and medications filed with this encounter. The patient indicates understanding of these issues and agrees with the plan.      Malik Guerrero MD  [facility] This Visit:  Requested Prescriptions      No prescriptions requested or ordered in this encounter       Imaging & Referrals:  None     ID#[id number]

## 2025-03-11 ENCOUNTER — PATIENT MESSAGE (OUTPATIENT)
Dept: NEUROLOGY | Facility: CLINIC | Age: 80
End: 2025-03-11

## 2025-05-19 ENCOUNTER — HOSPITAL ENCOUNTER (EMERGENCY)
Facility: HOSPITAL | Age: 80
Discharge: HOME | End: 2025-05-19
Attending: EMERGENCY MEDICINE
Payer: MEDICARE

## 2025-05-19 ENCOUNTER — APPOINTMENT (OUTPATIENT)
Dept: CT IMAGING | Facility: HOSPITAL | Age: 80
End: 2025-05-19
Attending: EMERGENCY MEDICINE
Payer: MEDICARE

## 2025-05-19 ENCOUNTER — APPOINTMENT (OUTPATIENT)
Dept: GENERAL RADIOLOGY | Facility: HOSPITAL | Age: 80
End: 2025-05-19
Payer: MEDICARE

## 2025-05-19 VITALS
DIASTOLIC BLOOD PRESSURE: 60 MMHG | TEMPERATURE: 99 F | OXYGEN SATURATION: 100 % | BODY MASS INDEX: 22.29 KG/M2 | SYSTOLIC BLOOD PRESSURE: 120 MMHG | HEIGHT: 67 IN | HEART RATE: 68 BPM | RESPIRATION RATE: 17 BRPM | WEIGHT: 142 LBS

## 2025-05-19 DIAGNOSIS — R07.9 CHEST PAIN OF UNCERTAIN ETIOLOGY: Primary | ICD-10-CM

## 2025-05-19 LAB
ALBUMIN SERPL-MCNC: 4.4 G/DL (ref 3.2–4.8)
ALBUMIN/GLOB SERPL: 1.6 {RATIO} (ref 1–2)
ALP LIVER SERPL-CCNC: 45 U/L (ref 45–117)
ALT SERPL-CCNC: 22 U/L (ref 10–49)
ANION GAP SERPL CALC-SCNC: 6 MMOL/L (ref 0–18)
APTT PPP: 27.6 SECONDS (ref 23–36)
AST SERPL-CCNC: 30 U/L (ref ?–34)
BASOPHILS # BLD AUTO: 0.01 X10(3) UL (ref 0–0.2)
BASOPHILS NFR BLD AUTO: 0.2 %
BILIRUB SERPL-MCNC: 2 MG/DL (ref 0.2–1.1)
BUN BLD-MCNC: 11 MG/DL (ref 9–23)
CALCIUM BLD-MCNC: 9.4 MG/DL (ref 8.7–10.6)
CHLORIDE SERPL-SCNC: 101 MMOL/L (ref 98–112)
CO2 SERPL-SCNC: 28 MMOL/L (ref 21–32)
CREAT BLD-MCNC: 0.82 MG/DL (ref 0.7–1.3)
EGFRCR SERPLBLD CKD-EPI 2021: 90 ML/MIN/1.73M2 (ref 60–?)
EOSINOPHIL # BLD AUTO: 0.16 X10(3) UL (ref 0–0.7)
EOSINOPHIL NFR BLD AUTO: 3.2 %
ERYTHROCYTE [DISTWIDTH] IN BLOOD BY AUTOMATED COUNT: 12.4 %
GLOBULIN PLAS-MCNC: 2.7 G/DL (ref 2–3.5)
GLUCOSE BLD-MCNC: 103 MG/DL (ref 70–99)
HCT VFR BLD AUTO: 38.6 % (ref 39–53)
HGB BLD-MCNC: 13 G/DL (ref 13–17.5)
IMM GRANULOCYTES # BLD AUTO: 0.01 X10(3) UL (ref 0–1)
IMM GRANULOCYTES NFR BLD: 0.2 %
INR BLD: 1.04 (ref 0.8–1.2)
LIPASE SERPL-CCNC: 52 U/L (ref 12–53)
LYMPHOCYTES # BLD AUTO: 1.72 X10(3) UL (ref 1–4)
LYMPHOCYTES NFR BLD AUTO: 34.1 %
MCH RBC QN AUTO: 29.5 PG (ref 26–34)
MCHC RBC AUTO-ENTMCNC: 33.7 G/DL (ref 31–37)
MCV RBC AUTO: 87.7 FL (ref 80–100)
MONOCYTES # BLD AUTO: 0.58 X10(3) UL (ref 0.1–1)
MONOCYTES NFR BLD AUTO: 11.5 %
NEUTROPHILS # BLD AUTO: 2.56 X10 (3) UL (ref 1.5–7.7)
NEUTROPHILS # BLD AUTO: 2.56 X10(3) UL (ref 1.5–7.7)
NEUTROPHILS NFR BLD AUTO: 50.8 %
NT-PROBNP SERPL-MCNC: 62 PG/ML (ref ?–450)
OSMOLALITY SERPL CALC.SUM OF ELEC: 280 MOSM/KG (ref 275–295)
PLATELET # BLD AUTO: 215 10(3)UL (ref 150–450)
POTASSIUM SERPL-SCNC: 4.1 MMOL/L (ref 3.5–5.1)
PROT SERPL-MCNC: 7.1 G/DL (ref 5.7–8.2)
PROTHROMBIN TIME: 13.7 SECONDS (ref 11.6–14.8)
RBC # BLD AUTO: 4.4 X10(6)UL (ref 3.8–5.8)
SODIUM SERPL-SCNC: 135 MMOL/L (ref 136–145)
TROPONIN I SERPL HS-MCNC: <3 NG/L (ref ?–53)
TROPONIN I SERPL HS-MCNC: <3 NG/L (ref ?–53)
WBC # BLD AUTO: 5 X10(3) UL (ref 4–11)

## 2025-05-19 PROCEDURE — 85610 PROTHROMBIN TIME: CPT | Performed by: EMERGENCY MEDICINE

## 2025-05-19 PROCEDURE — 36415 COLL VENOUS BLD VENIPUNCTURE: CPT

## 2025-05-19 PROCEDURE — 93010 ELECTROCARDIOGRAM REPORT: CPT

## 2025-05-19 PROCEDURE — 71275 CT ANGIOGRAPHY CHEST: CPT | Performed by: EMERGENCY MEDICINE

## 2025-05-19 PROCEDURE — 85610 PROTHROMBIN TIME: CPT

## 2025-05-19 PROCEDURE — 83880 ASSAY OF NATRIURETIC PEPTIDE: CPT

## 2025-05-19 PROCEDURE — 80053 COMPREHEN METABOLIC PANEL: CPT | Performed by: EMERGENCY MEDICINE

## 2025-05-19 PROCEDURE — 74175 CTA ABDOMEN W/CONTRAST: CPT | Performed by: EMERGENCY MEDICINE

## 2025-05-19 PROCEDURE — 85730 THROMBOPLASTIN TIME PARTIAL: CPT

## 2025-05-19 PROCEDURE — 83880 ASSAY OF NATRIURETIC PEPTIDE: CPT | Performed by: EMERGENCY MEDICINE

## 2025-05-19 PROCEDURE — 71045 X-RAY EXAM CHEST 1 VIEW: CPT | Performed by: EMERGENCY MEDICINE

## 2025-05-19 PROCEDURE — 84484 ASSAY OF TROPONIN QUANT: CPT | Performed by: EMERGENCY MEDICINE

## 2025-05-19 PROCEDURE — 80053 COMPREHEN METABOLIC PANEL: CPT

## 2025-05-19 PROCEDURE — 99285 EMERGENCY DEPT VISIT HI MDM: CPT

## 2025-05-19 PROCEDURE — 84484 ASSAY OF TROPONIN QUANT: CPT

## 2025-05-19 PROCEDURE — 99284 EMERGENCY DEPT VISIT MOD MDM: CPT

## 2025-05-19 PROCEDURE — 85025 COMPLETE CBC W/AUTO DIFF WBC: CPT | Performed by: EMERGENCY MEDICINE

## 2025-05-19 PROCEDURE — 93005 ELECTROCARDIOGRAM TRACING: CPT

## 2025-05-19 PROCEDURE — 83690 ASSAY OF LIPASE: CPT | Performed by: EMERGENCY MEDICINE

## 2025-05-19 PROCEDURE — 85025 COMPLETE CBC W/AUTO DIFF WBC: CPT

## 2025-05-19 PROCEDURE — 85730 THROMBOPLASTIN TIME PARTIAL: CPT | Performed by: EMERGENCY MEDICINE

## 2025-05-19 NOTE — ED INITIAL ASSESSMENT (HPI)
Pt arrives to ED for CP, pt points to his left lower chest region with radiation to his back, pt c/o burning sensation. Pt denies n/v, pt stated the pain started about 1530 today. Pt states the pain became sharp at 1645, pt has returned to \"low level.\" Pt states he has a coughing/mucus issue wherein he spends  most of the day trying to cough up mucus, pt states it is chronic.

## 2025-05-19 NOTE — ED PROVIDER NOTES
Patient Seen in: [facility] Emergency Department      History     Chief Complaint   Patient presents with    Chest Pain Angina     Stated Complaint: cp    Subjective:   HPI  Patient is a 78 yo M with a history of HTN, HLD, CAD who presents to ED sent in by IC for evaluation of chest pain. Pt states he was carrying groceries earlier today but denies any trauma. Around 3 PM, pt was standing when he developed left sided chest discomfort which resolved after a few minutes. Pt reports this pain was nonpleuritic, non exertional. Pt also developed pain to L scapular region. No alleviating factors. Pt had another episode of more sharp L sided chest pain later in the evening. Pain worse with pressing on area. No other associated symptoms. No fevers, cough, diaphoresis, n/v, abd pain, SOB. No hx of blood clots, recent travel/surgeries.     From chart review, pt saw cardiology 6/2024. Last cath 9/2019 showing 50% LAD, 90% ostial large diag which was treated medically.       Objective:     Past Medical History:    Chest pain    Coronary atherosclerosis    COVID-19    fever, body aches, runny nose    Hearing impairment    per children    High blood pressure    High cholesterol    Hx of motion sickness    Hyperlipidemia    Visual impairment    glasses              Past Surgical History:   Procedure Laterality Date    Colonoscopy N/A 7/5/2023    Procedure: COLONOSCOPY;  Surgeon: Adolfo Murillo MD;  Location:  ENDOSCOPY    Colonoscopy & polypectomy  09/2018    adenoma- repeat 5 yrs    Cyst removal Right 3/10/21--Dr. Gildardo Archibald    thumb mucous cyst excision                Social History     Socioeconomic History    Marital status:    Tobacco Use    Smoking status: Never    Smokeless tobacco: Never   Vaping Use    Vaping status: Never Used   Substance and Sexual Activity    Alcohol use: Never     Alcohol/week: 0.0 standard drinks of alcohol    Drug use: Never   Other Topics Concern    Caffeine Concern Yes      Comment: coffee and tea- 2/day total    Exercise No     Social Drivers of Health     Food Insecurity: No Food Insecurity (10/22/2024)    Received from [facility] - Food Insecurity     Worried About Running Out of Food in the Last Year: No     Ran Out of Food in the Last Year: No   Transportation Needs: No Transportation Needs (10/22/2024)    Received from [facility] - Transportation     Lack of Transportation: No   Housing Stability: Not At Risk (10/22/2024)    Received from [facility] - Housing/Utilities     Has Housing: Yes     Worried About Losing Housing: No     Unable to Get Utilities: No                                Physical Exam     ED Triage Vitals   BP 05/19/25 1841 132/56   Pulse 05/19/25 1841 64   Resp 05/19/25 1841 17   Temp 05/19/25 1856 98.7 °F (37.1 °C)   Temp src 05/19/25 1856 Temporal   SpO2 05/19/25 1841 100 %   O2 Device 05/19/25 1841 None (Room air)       Current Vitals:   Vital Signs  BP: 120/60  Pulse: 68  Resp: 17  Temp: 98.7 °F (37.1 °C)  Temp src: Temporal  MAP (mmHg): 78    Oxygen Therapy  SpO2: 100 %  O2 Device: None (Room air)          Physical Exam  Vitals and nursing note reviewed.   Constitutional:       General: He is not in acute distress.  HENT:      Head: Normocephalic and atraumatic.      Nose: Nose normal.      Mouth/Throat:      Mouth: Mucous membranes are moist.   Eyes:      Extraocular Movements: Extraocular movements intact.      Pupils: Pupils are equal, round, and reactive to light.   Cardiovascular:      Rate and Rhythm: Normal rate and regular rhythm.      Pulses: Normal pulses.   Pulmonary:      Effort: Pulmonary effort is normal. No respiratory distress.      Breath sounds: No wheezing.   Chest:       Abdominal:      General: There is no distension.      Palpations: Abdomen is soft.   Musculoskeletal:         General: No swelling. Normal range of motion.        Arms:       Cervical back: Normal range of motion.    Skin:     General: Skin is warm and dry.      Capillary Refill: Capillary refill takes less than 2 seconds.   Neurological:      General: No focal deficit present.      Mental Status: He is alert.   Psychiatric:         Mood and Affect: Mood normal.                   ED Course     Labs Reviewed   COMP METABOLIC PANEL (14) - Abnormal; Notable for the following components:       Result Value    Glucose 103 (*)     Sodium 135 (*)     Bilirubin, Total 2.0 (*)     All other components within normal limits   CBC WITH DIFFERENTIAL WITH PLATELET - Abnormal; Notable for the following components:    HCT 38.6 (*)     All other components within normal limits   TROPONIN I HIGH SENSITIVITY - Normal   PRO BETA NATRIURETIC PEPTIDE - Normal   PROTHROMBIN TIME (PT) - Normal   PTT, ACTIVATED - Normal   LIPASE - Normal   TROPONIN I HIGH SENSITIVITY - Normal     EKG #1    Rate, intervals and axes as noted on EKG Report.  Rate: 63  Rhythm: Sinus Rhythm  Reading: NSR with ventricular rate of 63, no acute ST elevations or depressions, normal intervals.       EKG #2    Rate, intervals and axes as noted on EKG Report.  Rate: 64  Rhythm: Sinus Rhythm  Reading: NSR with ventricular rate of 64, no acute ST elevations or depressions, normal intervals. Similar to initial.                          MDM      Patient is a 80 yo M with a history of HTN, HLD, CAD who presents to ED sent in by IC for evaluation of chest pain. VSS. Pt has reproducible L chest wall and scapular back pain. Distally NVI. Ddx includes but not limited to MSK pain given it is reproducible, ACS, atypical chest pain. Less likely aortic dissection with normal vitals, no ripping/tearing pain, and pt is distally NVI but will obtain CTA to futher evaluate. Doubt PE.  Plan for labs, CXR, EKG, CTA.     ED Course as of 05/20/25 0457  ------------------------------------------------------------  Time: 05/19 1946  Comment: CBC, CMP unremarkable. Trop negative. BNP wnl. Lipase wnl.    ------------------------------------------------------------  Time: 05/19 1946  Comment: CXR independently reviewed, shows no acute process.   ------------------------------------------------------------  Time: 05/19 2018  Comment: CONCLUSION:  There is no evidence of thoracic or abdominal aortic aneurysm or dissection on this nongated exam.      No pulmonary embolism to the first subsegmental arterial level.      A micro nodule is noted of the left lower lobe measuring up to 3 mm. According to Fleischner guidelines, solid nodules measuring less than 6 mm require no further followup in low risk patients. In high-risk patients with a history of smoking or other   risk factors, consider CT at 12 months.     ------------------------------------------------------------  Time: 05/19 2153  Comment: Repeat EKG shows no changes. Repeat troponin is negative.  Patient reevaluated and remains well-appearing.  No significant pain at this time.       I discussed with cardiology on call (Dr. Oscar) who is okay with patient following up as outpatient for stress test this week if pt feels comfortable with this. HEART score is 4.   I did offer admission however patient preferred discharged home with outpatient workup. Discussed importance of further testing with his hx of RF. Duly cards to call pt to make appt this week for further testing.       Discussed strict return precautions with patient and his wife at bedside.  I also reviewed incidental findings of pulmonary nodule and recommended follow-up with PCP for this.  All questions answered.  Patient verbalized understanding and agreement of plan.             Medical Decision Making  Amount and/or Complexity of Data Reviewed  External Data Reviewed: notes.  Labs: ordered. Decision-making details documented in ED Course.  Radiology: ordered and independent interpretation performed. Decision-making details documented in ED Course.  ECG/medicine tests: ordered and independent  interpretation performed. Decision-making details documented in ED Course.  Discussion of management or test interpretation with external provider(s): Cards        Disposition and Plan     Clinical Impression:  1. Chest pain of uncertain etiology         Disposition:  Discharge  5/19/2025  9:59 pm    Follow-up:  Mckay Argueta MD  [address]  [address]  [facility] [address]  [phone]    Call in 1 day(s)            Medications Prescribed:  Discharge Medication List as of 5/19/2025 10:03 PM                Supplementary Documentation:

## 2025-05-20 NOTE — DISCHARGE INSTRUCTIONS
You were evaluated in the Emergency Department today for chest pain. Your evaluation has shown no signs of medical conditions requiring emergent intervention at this time, however we recommend that you follow up with your cardiologist as soon as possible for further testing as an outpatient. You will need a stress test this week. Please call them to make an appointment.     Return to the Emergency Department if you experience worsening or uncontrolled chest pain, shortness of breath, light headedness, feeling faint, nausea, vomiting, or any other concerning symptoms. Continue with aspirin.     Please schedule an appointment for follow up with your primary care physician as soon as possible.

## 2025-05-21 LAB
ATRIAL RATE: 63 BPM
ATRIAL RATE: 64 BPM
P AXIS: 53 DEGREES
P AXIS: 62 DEGREES
P-R INTERVAL: 140 MS
P-R INTERVAL: 146 MS
Q-T INTERVAL: 402 MS
Q-T INTERVAL: 408 MS
QRS DURATION: 84 MS
QRS DURATION: 90 MS
QTC CALCULATION (BEZET): 414 MS
QTC CALCULATION (BEZET): 417 MS
R AXIS: 54 DEGREES
R AXIS: 65 DEGREES
T AXIS: 57 DEGREES
T AXIS: 70 DEGREES
VENTRICULAR RATE: 63 BPM
VENTRICULAR RATE: 64 BPM

## 2025-06-09 DIAGNOSIS — G44.099 OTHER TRIGEMINAL AUTONOMIC CEPHALGIA (TAC), NOT INTRACTABLE: ICD-10-CM

## 2025-06-10 DIAGNOSIS — G44.099 OTHER TRIGEMINAL AUTONOMIC CEPHALGIA (TAC), NOT INTRACTABLE: ICD-10-CM

## 2025-06-10 RX ORDER — INDOMETHACIN 25 MG/1
25 CAPSULE ORAL 2 TIMES DAILY WITH MEALS
Qty: 180 CAPSULE | Refills: 0 | OUTPATIENT
Start: 2025-06-10

## 2025-06-10 RX ORDER — INDOMETHACIN 25 MG/1
25 CAPSULE ORAL 2 TIMES DAILY WITH MEALS
Qty: 180 CAPSULE | Refills: 0 | Status: SHIPPED | OUTPATIENT
Start: 2025-06-10

## 2025-06-10 NOTE — TELEPHONE ENCOUNTER
Medication: indomethacin 25 MG Oral Cap      Date of last refill: 12/02/2024 (#180/0)  [place]     Last office visit: 12/09/2024  Due back to clinic per last office note:  1 year   Date next office visit scheduled:    No future appointments.        Last OV note recommendation:    ASSESSMENT/PLAN:          ICD-10-CM     1. Other trigeminal autonomic cephalgia (TAC), not intractable  G44.099                  1.  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    The patient believes that he was going through some personal stress and that reflected on the testing.    Observation for now.  We will repeat the testing as needed        2. Episodic paroxysmal hemicrania  Stable.  On Indocin as needed     Follow up in about 1 year or as needed     See orders and medications filed with this encounter. The patient indicates understanding of these issues and agrees with the plan.

## 2025-06-10 NOTE — TELEPHONE ENCOUNTER
Pt called to f/u on refill request. Advised Pt it is pending Provider approval. Pt will run out of medication in a couple of days. Please advise, Pt's best call back number is [phone]. Endorsed to RN.

## (undated) DEVICE — BIOGUARD CLEANING ADAPTER

## (undated) DEVICE — ENDOSCOPY PACK - LOWER: Brand: MEDLINE INDUSTRIES, INC.

## (undated) DEVICE — 1200CC GUARDIAN II: Brand: GUARDIAN

## (undated) DEVICE — 3M™ RED DOT™ MONITORING ELECTRODE WITH FOAM TAPE AND STICKY GEL, 50/BAG, 20/CASE, 72/PLT 2570: Brand: RED DOT™

## (undated) DEVICE — KIT ENDO ORCAPOD 160/180/190

## (undated) DEVICE — 10FT COMBINED O2 DELIVERY/CO2 MONITORING. FILTER WITH MICROSTREAM TYPE LUER: Brand: DUAL ADULT NASAL CANNULA

## (undated) NOTE — LETTER
[facility] [address], [address]  Consent for Procedure/Sedation    Date:        Time:       1.  I authorize the performance upon Christel Mendez the following:cardiac catheterization, left ventricular cineangiography, bilateral s period, the physician will determine when the applicable recovery period ends for purposes of reinstating the Do Not Resuscitate (DNR) order.     Signature of Patient: ____________________________________________________    Signature of person authorized

## (undated) NOTE — Clinical Note
Thank you for this referral. Patient likely has Paroxysmal hemicrania and started on Indocin.  Please see consult note for details